# Patient Record
Sex: MALE | Race: WHITE | NOT HISPANIC OR LATINO | Employment: FULL TIME | URBAN - NONMETROPOLITAN AREA
[De-identification: names, ages, dates, MRNs, and addresses within clinical notes are randomized per-mention and may not be internally consistent; named-entity substitution may affect disease eponyms.]

---

## 2023-02-25 ENCOUNTER — APPOINTMENT (EMERGENCY)
Dept: CT IMAGING | Facility: HOSPITAL | Age: 53
End: 2023-02-25

## 2023-02-25 ENCOUNTER — HOSPITAL ENCOUNTER (EMERGENCY)
Facility: HOSPITAL | Age: 53
Discharge: HOME | End: 2023-02-25
Attending: EMERGENCY MEDICINE | Admitting: EMERGENCY MEDICINE

## 2023-02-25 VITALS
RESPIRATION RATE: 16 BRPM | HEART RATE: 80 BPM | DIASTOLIC BLOOD PRESSURE: 90 MMHG | SYSTOLIC BLOOD PRESSURE: 131 MMHG | OXYGEN SATURATION: 98 % | TEMPERATURE: 98 F

## 2023-02-25 DIAGNOSIS — R10.12 LEFT UPPER QUADRANT ABDOMINAL PAIN: Primary | ICD-10-CM

## 2023-02-25 LAB
ALBUMIN SERPL BCP-MCNC: 4.5 G/DL (ref 3.5–5)
ALP SERPL-CCNC: 92 U/L (ref 34–104)
ALT SERPL W P-5'-P-CCNC: 16 U/L (ref 7–52)
ANION GAP SERPL CALCULATED.3IONS-SCNC: 1 MMOL/L (ref 4–13)
AST SERPL W P-5'-P-CCNC: 13 U/L (ref 13–39)
BASOPHILS # BLD AUTO: 0.13 THOUSANDS/ÂΜL (ref 0–0.1)
BASOPHILS NFR BLD AUTO: 1 % (ref 0–1)
BILIRUB SERPL-MCNC: 0.37 MG/DL (ref 0.2–1)
BUN SERPL-MCNC: 8 MG/DL (ref 5–25)
CALCIUM SERPL-MCNC: 9.5 MG/DL (ref 8.4–10.2)
CHLORIDE SERPL-SCNC: 104 MMOL/L (ref 96–108)
CO2 SERPL-SCNC: 31 MMOL/L (ref 21–32)
CREAT SERPL-MCNC: 0.98 MG/DL (ref 0.6–1.3)
EOSINOPHIL # BLD AUTO: 0.47 THOUSAND/ÂΜL (ref 0–0.61)
EOSINOPHIL NFR BLD AUTO: 4 % (ref 0–6)
ERYTHROCYTE [DISTWIDTH] IN BLOOD BY AUTOMATED COUNT: 13.9 % (ref 11.6–15.1)
GFR SERPL CREATININE-BSD FRML MDRD: 89 ML/MIN/1.73SQ M
GLUCOSE SERPL-MCNC: 101 MG/DL (ref 65–140)
HCT VFR BLD AUTO: 48.2 % (ref 36.5–49.3)
HGB BLD-MCNC: 15.7 G/DL (ref 12–17)
IMM GRANULOCYTES # BLD AUTO: 0.04 THOUSAND/UL (ref 0–0.2)
IMM GRANULOCYTES NFR BLD AUTO: 0 % (ref 0–2)
LIPASE SERPL-CCNC: 47 U/L (ref 11–82)
LYMPHOCYTES # BLD AUTO: 3.23 THOUSANDS/ÂΜL (ref 0.6–4.47)
LYMPHOCYTES NFR BLD AUTO: 28 % (ref 14–44)
MCH RBC QN AUTO: 28.8 PG (ref 26.8–34.3)
MCHC RBC AUTO-ENTMCNC: 32.6 G/DL (ref 31.4–37.4)
MCV RBC AUTO: 88 FL (ref 82–98)
MONOCYTES # BLD AUTO: 0.73 THOUSAND/ÂΜL (ref 0.17–1.22)
MONOCYTES NFR BLD AUTO: 6 % (ref 4–12)
NEUTROPHILS # BLD AUTO: 6.84 THOUSANDS/ÂΜL (ref 1.85–7.62)
NEUTS SEG NFR BLD AUTO: 61 % (ref 43–75)
NRBC BLD AUTO-RTO: 0 /100 WBCS
PLATELET # BLD AUTO: 309 THOUSANDS/UL (ref 149–390)
PMV BLD AUTO: 9.8 FL (ref 8.9–12.7)
POTASSIUM SERPL-SCNC: 4.2 MMOL/L (ref 3.5–5.3)
PROT SERPL-MCNC: 6.9 G/DL (ref 6.4–8.4)
RBC # BLD AUTO: 5.46 MILLION/UL (ref 3.88–5.62)
SODIUM SERPL-SCNC: 136 MMOL/L (ref 135–147)
WBC # BLD AUTO: 11.44 THOUSAND/UL (ref 4.31–10.16)

## 2023-02-25 RX ORDER — KETOROLAC TROMETHAMINE 30 MG/ML
15 INJECTION, SOLUTION INTRAMUSCULAR; INTRAVENOUS ONCE
Status: COMPLETED | OUTPATIENT
Start: 2023-02-25 | End: 2023-02-25

## 2023-02-25 RX ADMIN — KETOROLAC TROMETHAMINE 15 MG: 30 INJECTION, SOLUTION INTRAMUSCULAR; INTRAVENOUS at 16:46

## 2023-02-25 RX ADMIN — IOHEXOL 100 ML: 350 INJECTION, SOLUTION INTRAVENOUS at 17:07

## 2023-02-25 NOTE — DISCHARGE INSTRUCTIONS
You were seen in the ED for abdominal pain  We did testing which included labwork and CT imaging  Results were normal  We did not find a definitive diagnosis for your abdominal pain today  Lots of different things can cause abdominal pain, therefore it is important you keep monitoring your symptoms at home  Common causes include appendicitis, kidney stone, viral illness, urinary tract infection, gallstones  If your symptoms get worse, please see your PCP or return to the ED  Return also if you have persistent vomiting, vomiting up blood, having bloody poops, bloody urine, or any other concerns

## 2023-02-25 NOTE — ED PROVIDER NOTES
History  Chief Complaint   Patient presents with   • Abdominal Pain     Pt states left abdominal pain started Monday  Denies N/V/D  States "bulge in the area and worse when pressure is applied"  Progressively getting worse     HPI   52M w hx of HTN, CAD presenting with abdominal pain  Patient reports left sided abdominal pain for the past week  Describes pain as constant and burning sensation  Pain improved when he applies pressure, and worsened when he does not apply pressure  Wife feels that the left side of his abdomen is more swollen  Patient denies previous abdominal surgeries  Denies chest pain, SOB, nausea/vomiting/diarrhea, urinary symptoms  Past Medical History:   Diagnosis Date   • Heart attack [facility])    • Hypertension        Past Surgical History:   Procedure Laterality Date   • CARDIAC SURGERY         History reviewed  No pertinent family history  I have reviewed and agree with the history as documented  E-Cigarette/Vaping   • E-Cigarette Use Never User      E-Cigarette/Vaping Substances     Social History     Tobacco Use   • Smoking status: Every Day     Packs/day: 1 50     Types: Cigarettes   • Smokeless tobacco: Never   Vaping Use   • Vaping Use: Never used   Substance Use Topics   • Alcohol use: Never   • Drug use: Never       Review of Systems   Constitutional: Negative for chills and fever  HENT: Negative for ear pain and sore throat  Eyes: Negative for pain and visual disturbance  Respiratory: Negative for cough and shortness of breath  Cardiovascular: Negative for chest pain and palpitations  Gastrointestinal: Positive for abdominal pain  Negative for nausea and vomiting  Genitourinary: Negative for dysuria and hematuria  Musculoskeletal: Negative for arthralgias and back pain  Skin: Negative for color change and rash  Neurological: Negative for seizures and syncope  All other systems reviewed and are negative        Physical Exam  Physical Exam  Vitals and nursing note reviewed  Constitutional:       Appearance: He is well-developed  HENT:      Head: Normocephalic and atraumatic  Right Ear: External ear normal       Left Ear: External ear normal       Nose: Nose normal    Eyes:      Conjunctiva/sclera: Conjunctivae normal    Cardiovascular:      Rate and Rhythm: Normal rate and regular rhythm  Pulmonary:      Effort: Pulmonary effort is normal  No respiratory distress  Breath sounds: Normal breath sounds  Abdominal:      Palpations: Abdomen is soft  Tenderness: There is abdominal tenderness in the left upper quadrant  Comments: No masses or swelling visualized or palpated on my exam   Musculoskeletal:      Cervical back: Normal range of motion and neck supple  Skin:     General: Skin is warm and dry  Findings: No erythema or rash  Comments: No rash over left abdominal region  Neurological:      General: No focal deficit present  Mental Status: He is alert  Mental status is at baseline            Vital Signs  ED Triage Vitals   Temperature Pulse Respirations Blood Pressure SpO2   02/25/23 1420 02/25/23 1420 02/25/23 1420 02/25/23 1420 02/25/23 1420   98 °F (36 7 °C) 75 20 152/91 99 %      Temp Source Heart Rate Source Patient Position - Orthostatic VS BP Location FiO2 (%)   02/25/23 1420 02/25/23 1649 02/25/23 1649 02/25/23 1649 --   Temporal Monitor Sitting Left arm       Pain Score       02/25/23 1420       6           Vitals:    02/25/23 1420 02/25/23 1649   BP: 152/91 131/90   Pulse: 75 80   Patient Position - Orthostatic VS:  Sitting         Visual Acuity      ED Medications  Medications   ketorolac (TORADOL) injection 15 mg (15 mg Intravenous Given 2/25/23 1646)   iohexol (OMNIPAQUE) 350 MG/ML injection (SINGLE-DOSE) 100 mL (100 mL Intravenous Given 2/25/23 1707)       Diagnostic Studies  Results Reviewed     Procedure Component Value Units Date/Time    CMP [[id number]]  (Abnormal) Collected: 02/25/23 1540    Lab Status: Final result Specimen: Blood from Arm, Left Updated: 02/25/23 1610     Sodium 136 mmol/L      Potassium 4 2 mmol/L      Chloride 104 mmol/L      CO2 31 mmol/L      ANION GAP 1 mmol/L      BUN 8 mg/dL      Creatinine 0 98 mg/dL      Glucose 101 mg/dL      Calcium 9 5 mg/dL      AST 13 U/L      ALT 16 U/L      Alkaline Phosphatase 92 U/L      Total Protein 6 9 g/dL      Albumin 4 5 g/dL      Total Bilirubin 0 37 mg/dL      eGFR 89 ml/min/1 73sq m     Narrative:      [place] guidelines for Chronic Kidney Disease (CKD):   •  Stage 1 with normal or high GFR (GFR > 90 mL/min/1 73 square meters)  •  Stage 2 Mild CKD (GFR = 60-89 mL/min/1 73 square meters)  •  Stage 3A Moderate CKD (GFR = 45-59 mL/min/1 73 square meters)  •  Stage 3B Moderate CKD (GFR = 30-44 mL/min/1 73 square meters)  •  Stage 4 Severe CKD (GFR = 15-29 mL/min/1 73 square meters)  •  Stage 5 End Stage CKD (GFR <15 mL/min/1 73 square meters)  Note: GFR calculation is accurate only with a steady state creatinine    Lipase [[id number]]  (Normal) Collected: 02/25/23 1540    Lab Status: Final result Specimen: Blood from Arm, Left Updated: 02/25/23 1610     Lipase 47 u/L     CBC and differential [[id number]]  (Abnormal) Collected: 02/25/23 1540    Lab Status: Final result Specimen: Blood from Arm, Left Updated: 02/25/23 1550     WBC 11 44 Thousand/uL      RBC 5 46 Million/uL      Hemoglobin 15 7 g/dL      Hematocrit 48 2 %      MCV 88 fL      MCH 28 8 pg      MCHC 32 6 g/dL      RDW 13 9 %      MPV 9 8 fL      Platelets 364 Thousands/uL      nRBC 0 /100 WBCs      Neutrophils Relative 61 %      Immat GRANS % 0 %      Lymphocytes Relative 28 %      Monocytes Relative 6 %      Eosinophils Relative 4 %      Basophils Relative 1 %      Neutrophils Absolute 6 84 Thousands/µL      Immature Grans Absolute 0 04 Thousand/uL      Lymphocytes Absolute 3 23 Thousands/µL      Monocytes Absolute 0 73 Thousand/µL      Eosinophils Absolute 0 47 Thousand/µL Basophils Absolute 0 13 Thousands/µL                CT Abdomen pelvis with contrast   Final Result by Tommie Fletcher MD (02/25 1802)      No acute inflammatory changes within the abdomen or the pelvis  Workstation performed: [id number]                Procedures  Procedures     ED Course  ED Course as of 02/25/23 1823   Sat Feb 25, 2023   1551 WBC(!): 11 44   1551 Hemoglobin: 15 7   1627 Sodium: 136   1627 Potassium: 4 2   1627 Lipase: 47   1816 CT Abd/Pelvis  IMPRESSION:  No acute inflammatory changes within the abdomen or the pelvis  Medical Decision Making  52M presenting with left sided abdominal pain  Abdomen soft w mild tenderness in left mid-abdominal region  Significant other felt that there was mass over patient's left abdominal region - I could not visualize any swelling or mass over left abdominal region  There was no erythema or rash  Ddxs include pancreatitis, diverticulitis, bowel obstruction, kidney stone  Labwork obtained w/o normal CMP  CBC with mild leukocytosis 11 44  Lipase wnl  CT abd/pelvis w/o acute intraabdominal abnl  I discussed results with patient  Other consideration is for shingles as patient describes burning over left side of abdomen; description of location of pain is in dermatomal pattern  I advised patient to monitor for rashes or lesions in case of shingles  Advised OTC medications  Discharged in stable condition  Left upper quadrant abdominal pain: acute illness or injury  Amount and/or Complexity of Data Reviewed  Labs: ordered  Decision-making details documented in ED Course  Radiology: ordered  Risk  OTC drugs        Disposition  Final diagnoses:   Left upper quadrant abdominal pain     Time reflects when diagnosis was documented in both MDM as applicable and the Disposition within this note     Time User Action Codes Description Comment    2/25/2023  6:19 PM Paula Reeder Add [R10 12] Left upper quadrant abdominal pain       ED Disposition ED Disposition   Discharge    Condition   Stable    Date/Time   Sat Feb 25, 2023  6:19 PM    Comment   Cortez Hoskins discharge to home/self care  Follow-up Information     Follow up With Specialties Details Why [address], DO  Go to  As needed, If symptoms worsen [id number] [facility] [phone]            Patient's Medications    No medications on file       No discharge procedures on file      PDMP Review     None          ED Provider  Electronically Signed by           Tereza Bangura MD  02/25/23 9757

## 2023-03-01 ENCOUNTER — HOSPITAL ENCOUNTER (EMERGENCY)
Facility: HOSPITAL | Age: 53
Discharge: HOME | End: 2023-03-01
Attending: EMERGENCY MEDICINE

## 2023-03-01 VITALS
WEIGHT: 196 LBS | SYSTOLIC BLOOD PRESSURE: 163 MMHG | HEART RATE: 78 BPM | DIASTOLIC BLOOD PRESSURE: 91 MMHG | OXYGEN SATURATION: 98 % | BODY MASS INDEX: 27.44 KG/M2 | TEMPERATURE: 97.2 F | HEIGHT: 71 IN | RESPIRATION RATE: 16 BRPM

## 2023-03-01 DIAGNOSIS — S63.502A WRIST SPRAIN, LEFT, INITIAL ENCOUNTER: Primary | ICD-10-CM

## 2023-03-01 RX ORDER — IBUPROFEN 600 MG/1
600 TABLET ORAL ONCE
Status: COMPLETED | OUTPATIENT
Start: 2023-03-01 | End: 2023-03-01

## 2023-03-01 RX ADMIN — IBUPROFEN 600 MG: 600 TABLET ORAL at 03:16

## 2023-03-01 NOTE — Clinical Note
Jose Alfredo Betancourt was seen and treated in our emergency department on 3/1/2023  No work until cleared by Family Doctor/Orthopedics        Diagnosis:     Khai Red[place]    He may return on this date: If you have any questions or concerns, please don't hesitate to call        Erik Leventhal, DO    ______________________________           _______________          _______________  Hospital Representative                              Date                                Time

## 2023-03-01 NOTE — ED PROVIDER NOTES
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, swollen and painful after repetitive motion  Patient denies hitting it on anything  States when he moves L thumb, L arm hurts  Patient is a 61-year-old male presenting to the emergency department complaining of painful swelling to his left forearm that started shortly before arrival, while he was at work, performing repetitive motion loading cartons, he denies any injury or trauma, no history of similar symptoms previously, states when he moves his thumb it hurts the most and is concerned he may have pulled or torn a ligament or tendon in this area, he had no symptoms prior to his shift at work, he did not take anything for pain prior to coming to the emergency department          None       Past Medical History:   Diagnosis Date   • Heart attack [facility])    • Hypertension        Past Surgical History:   Procedure Laterality Date   • CARDIAC SURGERY         History reviewed  No pertinent family history  I have reviewed and agree with the history as documented  E-Cigarette/Vaping   • E-Cigarette Use Never User      E-Cigarette/Vaping Substances     Social History     Tobacco Use   • Smoking status: Every Day     Packs/day: 1 50     Types: Cigarettes   • Smokeless tobacco: Never   Vaping Use   • Vaping Use: Never used   Substance Use Topics   • Alcohol use: Never   • Drug use: Never       Review of Systems   Constitutional: Negative  HENT: Negative  Eyes: Negative  Respiratory: Negative  Cardiovascular: Negative  Gastrointestinal: Negative  Endocrine: Negative  Genitourinary: Negative  Musculoskeletal: Positive for myalgias  Skin: Negative  Allergic/Immunologic: Negative  Neurological: Negative  Hematological: Negative  Psychiatric/Behavioral: Negative  Physical Exam  Physical Exam  Constitutional:       Appearance: He is well-developed  HENT:      Head: Normocephalic and atraumatic  Eyes:      Conjunctiva/sclera: Conjunctivae normal       Pupils: Pupils are equal, round, and reactive to light  Cardiovascular:      Rate and Rhythm: Normal rate  Pulmonary:      Effort: Pulmonary effort is normal    Abdominal:      Palpations: Abdomen is soft  Musculoskeletal:         General: Normal range of motion  Arms:       Cervical back: Normal range of motion and neck supple  Comments: Mild swelling and erythema noted to the medial surface of the left wrist/forearm, pain with range of motion testing of the thumb, 2+ radial pulses, distal sensation and motor is intact with brisk capillary refill, no bony deformity   Skin:     General: Skin is warm and dry  Neurological:      Mental Status: He is alert and oriented to person, place, and time  Vital Signs  ED Triage Vitals [03/01/23 0252]   Temperature Pulse Respirations Blood Pressure SpO2   (!) 97 2 °F (36 2 °C) 78 16 163/91 98 %      Temp Source Heart Rate Source Patient Position - Orthostatic VS BP Location FiO2 (%)   Temporal Monitor Sitting Right arm --      Pain Score       6           Vitals:    03/01/23 0252   BP: 163/91   Pulse: 78   Patient Position - Orthostatic VS: Sitting         Visual Acuity      ED Medications  Medications   ibuprofen (MOTRIN) tablet 600 mg (600 mg Oral Given 3/1/23 0316)       Diagnostic Studies  Results Reviewed     None                 No orders to display              Procedures  Splint application    Date/Time: 3/1/2023 3:18 AM  Performed by: Chip Madden DO  Authorized by: Chip Madden DO   Universal Protocol:  Consent: Verbal consent obtained    Risks and benefits: risks, benefits and alternatives were discussed  Consent given by: patient  Required items: required blood products, implants, devices, and special equipment available  Patient identity confirmed: verbally with patient and arm band      Pre-procedure details:     Sensation: Normal    Skin color:  Pink  Procedure details:     Laterality:  Left    Location:  Wrist    Wrist:  L wrist    Strapping: no      Splint type:  Wrist (Cock up wrist splint)  Post-procedure details:     Pain:  Improved    Sensation:  Normal    Skin color:  Pink    Patient tolerance of procedure: Tolerated well, no immediate complications             ED Course                               SBIRT 20yo+    Flowsheet Row Most Recent Value   SBIRT (23 yo +)    In order to provide better care to our patients, we are screening all of our patients for alcohol and drug use  Would it be okay to ask you these screening questions? Yes Filed at: 03/01/2023 0255   Initial Alcohol Screen: US AUDIT-C     1  How often do you have a drink containing alcohol? 0 Filed at: 03/01/2023 0255   2  How many drinks containing alcohol do you have on a typical day you are drinking? 0 Filed at: 03/01/2023 0255   3a  Male UNDER 65: How often do you have five or more drinks on one occasion? 0 Filed at: 03/01/2023 0255   Audit-C Score 0 Filed at: 03/01/2023 9574   BRODIE: How many times in the past year have you    Used an illegal drug or used a prescription medication for non-medical reasons?  Never Filed at: 03/01/2023 0255                    Medical Decision Making  Patient is a 68-year-old male presenting to the emergency department complaining of pain and swelling to his left wrist/forearm that started while at work, he was performing repetitive motion and loading cartons, he denies any injury, did not feel a pull or a snap, but did notice some redness and swelling to this area, he did not take anything for pain prior to coming to the emergency department, he denies having any symptoms prior to starting his work shift, on exam patient has mild swelling and erythema, no increased warmth, no bony deformity, 2+ radial pulses, distal sensation and motor is intact with full range of motion, he does have some pain with range of motion of the thumb, active or passive, symptoms consistent with likely soft tissue strain, he was provided with a cock-up wrist splint and instructions for follow-up with orthopedics, advised to return if any additional concerns    Wrist sprain, left, initial encounter: acute illness or injury  Risk  Prescription drug management  Disposition  Final diagnoses:   Wrist sprain, left, initial encounter     Time reflects when diagnosis was documented in both MDM as applicable and the Disposition within this note     Time User Action Codes Description Comment    3/1/2023  3:00 AM Cheng Manzano Add [Z54 513I] Wrist sprain, left, initial encounter       ED Disposition     ED Disposition   Discharge    Condition   Stable    Date/Time   Wed Mar 1, 2023  3:00 AM    Comment   Casandra Moreno discharge to home/self care                 Follow-up Information     Follow up With Specialties Details Why [address], DO  In 3 days  [address] [id number]  [phone]            Patient's Medications    No medications on file           [address] Review     None          ED Provider  Electronically Signed by           Evgeny Crook DO  03/01/23 5869

## 2023-10-20 ENCOUNTER — HOSPITAL ENCOUNTER (OUTPATIENT)
Dept: RADIOLOGY | Facility: CLINIC | Age: 53
End: 2023-10-20
Payer: COMMERCIAL

## 2023-10-20 VITALS
TEMPERATURE: 98.4 F | HEIGHT: 71 IN | BODY MASS INDEX: 27.44 KG/M2 | HEART RATE: 67 BPM | DIASTOLIC BLOOD PRESSURE: 80 MMHG | WEIGHT: 196 LBS | SYSTOLIC BLOOD PRESSURE: 120 MMHG

## 2023-10-20 DIAGNOSIS — M77.8 TENDINITIS OF LEFT FOREARM: ICD-10-CM

## 2023-10-20 DIAGNOSIS — M25.532 LEFT WRIST PAIN: ICD-10-CM

## 2023-10-20 DIAGNOSIS — M25.532 LEFT WRIST PAIN: Primary | ICD-10-CM

## 2023-10-20 PROCEDURE — 73090 X-RAY EXAM OF FOREARM: CPT

## 2023-10-20 PROCEDURE — 73110 X-RAY EXAM OF WRIST: CPT

## 2023-10-20 RX ORDER — LISINOPRIL 5 MG/1
5 TABLET ORAL DAILY
COMMUNITY

## 2023-10-20 RX ORDER — ROSUVASTATIN CALCIUM 5 MG/1
5 TABLET, COATED ORAL EVERY EVENING
COMMUNITY

## 2023-10-20 RX ORDER — CARVEDILOL 25 MG/1
25 TABLET ORAL 2 TIMES DAILY WITH MEALS
COMMUNITY

## 2023-10-20 NOTE — LETTER
October 20, 2023     Patient: Jacqueline Garcia  YOB: 1970  Date of Visit: 10/20/2023      To Whom it May Concern:    Theresa Badillo is under my professional care. Bree Gaspar was seen in my office on 10/20/2023. Bree Gaspar may return to work on 10/20/2023. He must be permitted to wear the cock-up wrist brace left upper extremity while working . If you have any questions or concerns, please don't hesitate to call.          Sincerely,          Shanel Lipoma        CC: No Recipients

## 2023-10-20 NOTE — PROGRESS NOTES
ASSESSMENT/PLAN:    Diagnoses and all orders for this visit:    Left wrist pain  -     XR forearm 2 vw left; Future  -     XR wrist 3+ vw left; Future    Tendinitis of left forearm  -     Ambulatory Referral to Orthopedic Surgery  -     Cock Up Wrist Splint    Other orders  -     carvedilol (COREG) 25 mg tablet; Take 25 mg by mouth 2 (two) times a day with meals  -     lisinopril (ZESTRIL) 5 mg tablet; Take 5 mg by mouth daily  -     Aspirin 81 MG CAPS  -     rosuvastatin (CRESTOR) 5 mg tablet; Take 5 mg by mouth every evening        Plan: I would recommend immobilization in a cock-up wrist brace which should be worn full-time, removed only for cleansing. I did discuss the benefits of physical therapy but he does not feel he would have time to attend physical therapy due to his work schedule. He was provided with a note allowing him to return to work but he must wear his brace while working. I will see him in 2 weeks for reevaluation. He is to contact me in the interim if questions or concerns arise. If his employer does not allow him to work with the brace in place, he was instructed to contact me so that we can initiate a course of physical therapy. Return in about 2 weeks (around 11/3/2023). _____________________________________________________  CHIEF COMPLAINT:  Chief Complaint   Patient presents with    Left Wrist - Pain, Swelling         SUBJECTIVE:  Felicita López is a 48y.o. year old right-handed male who presents for evaluation of left forearm pain. He had a similar episode of symptoms in the spring 2023 with resolution within 2 to 3 days without trauma. He noted onset of recurrent symptoms about 2 weeks ago, once again without trauma, but they have persisted over the past 2 weeks and have not improved in any way. He was seen by his primary care physician and then referred for orthopedic consultation and treatment. In the spring, he was using a cock-up wrist brace.   He has tried using this as well as ice recently but this has not provided any benefit. He has been taking some Tylenol as needed. He denies paresthesias. He has been able to continue working despite the symptoms. He denies any history of remote injury to the wrist or forearm. PAST MEDICAL HISTORY:  Past Medical History:   Diagnosis Date    Heart attack ([address])     Hypertension        PAST SURGICAL HISTORY:  Past Surgical History:   Procedure Laterality Date    CARDIAC SURGERY         FAMILY HISTORY:  History reviewed. No pertinent family history. SOCIAL HISTORY:  Social History     Tobacco Use    Smoking status: Every Day     Packs/day: 1.50     Types: Cigarettes    Smokeless tobacco: Never   Vaping Use    Vaping Use: Never used   Substance Use Topics    Alcohol use: Never    Drug use: Never       MEDICATIONS:    Current Outpatient Medications:     Aspirin 81 MG CAPS, , Disp: , Rfl:     carvedilol (COREG) 25 mg tablet, Take 25 mg by mouth 2 (two) times a day with meals, Disp: , Rfl:     lisinopril (ZESTRIL) 5 mg tablet, Take 5 mg by mouth daily, Disp: , Rfl:     rosuvastatin (CRESTOR) 5 mg tablet, Take 5 mg by mouth every evening, Disp: , Rfl:     ALLERGIES:  No Known Allergies    Review of systems:   Constitutional: Negative for fatigue, fever or loss of apetite. HENT: Negative. Respiratory: Negative for shortness of breath, dyspnea. Cardiovascular: Negative for chest pain/tightness. Gastrointestinal: Negative for abdominal pain, N/V. Endocrine: Negative for cold/heat intolerance, unexplained weight loss/gain. Genitourinary: Negative for flank pain, dysuria, hematuria. Musculoskeletal: Positive as in the HPI  Skin: Negative for rash. Neurological: Negative  Psychiatric/Behavioral: Negative for agitation.   _____________________________________________________  PHYSICAL EXAMINATION:    Blood pressure 120/80, pulse 67, temperature 98.4 °F (36.9 °C), temperature source Temporal, height 5' 11" (1.803 m), weight 88.9 kg (196 lb). General: well developed and well nourished, alert, oriented times 3, and appears comfortable  Psychiatric: Normal  HEENT: Benign  Cardiovascular: Regular    Pulmonary: No wheezing or stridor  Abdomen: Soft, Nontender  Skin: No masses, erythema, lacerations, fluctation, ulcerations  Neurovascular: Motor and sensory exams are intact and pulses palpable. MUSCULOSKELETAL EXAMINATION:    The left upper extremity exam demonstrates swelling in the dorsal forearm at the junction of the middle and distal thirds and extending distally toward the wrist.  He has tenderness to palpation along the radial border of the forearm at the junction of the middle and distal thirds. During active wrist extension there is palpable crepitus noted at the area of the junction of the middle and distal thirds of the forearm. He has somewhat limited dorsiflexion and volar flexion of the wrist due to pain. He has full range of motion of the fingers and thumb without complaints excluding some mild tightness during endrange flexion of the fingers. He has full forearm rotation without complaints. The bony and soft tissue structures of the wrist and hand are nontender. The proximal forearm is nontender and muscle compartments are soft.      _____________________________________________________  STUDIES REVIEWED:  X-rays of the left wrist and forearm demonstrate no bony injury.     PROCEDURES:  Procedures      Gianluca Scanlon